# Patient Record
(demographics unavailable — no encounter records)

---

## 2017-11-13 NOTE — PHYS DOC
General


Chief Complaint:  VOMITING IN PREGNANCY


Stated Complaint:  N/V/D


Time Seen by MD:  20:14


Source:  patient


Exam Limitations:  no limitations


Problems:  





History of Present Illness


Initial Comments


Patient is a 34 weeks gestation  2 para 1 female complaining of nausea 

vomiting diarrhea.


She is here with her significant other he says he has similar symptoms. They 

deny travel or bad food exposure, patient says since 6 AM she's had nonbloody 

emesis with by mouth intolerance and loose watery stools which are also 

nonbloody. No fever they've had some body aches and chills no chest pain or 

trouble breathing. Patient continues to feel baby move she denies any vaginal 

symptoms. Over-the-counter medications have not helped the patient is afraid to 

eat or drink. On arrival she is afebrile heart rate is in the 110's, fetal 

heart tones 160.


Timing/Duration:  24 hours


Severity:  severe


Modifying Factors:  worse with eating


Associated Symptoms:  nausea/vomiting, weakness


Allergies:  


Coded Allergies:  


     guaifenesin (Verified  Allergy, Unknown, 17)





Past Medical History


Medical History:  no pertinent history


Surgical History:  noncontributory


Para:  1


:  2


LMP (Females 10-50):  pregnant





Social History


Smoker:  non-smoker


Alcohol:  none


Drugs:  none





Review of Systems


Constitutional:  see HPI


Respiratory:  denies cough, denies shortness of breath


Cardiovascular:  denies chest pain, denies palpitations


Gastrointestinal:  see HPI, abdominal pain, diarrhea, nausea, vomiting


Genitourinary:  denies dysuria, denies frequency, denies hematuria


Musculoskeletal:  denies back pain, denies joint swelling, denies neck pain


Psychiatric/Neurological:  denies headache, denies numbness, denies paresthesia





Physical Exam


General Appearance:  WD/WN, mild distress


Ear, Nose, Throat:  hearing grossly normal, normal ENT inspection


Neck:  non-tender, supple


Respiratory:  normal breath sounds, no respiratory distress


Cardiovascular:  normal peripheral pulses, regular rate, rhythm


Gastrointestinal:  soft (gravid consistent with dates, some mild epigastric 

tenderness negative Foley negative McBurney bowel sounds are normal)


Back:  no CVA tenderness, no vertebral tenderness


Extremities:  non-tender, normal inspection


Neurologic/Psychiatric:  CNs II-XII nml as tested, no motor/sensory deficits, 

alert, normal mood/affect, oriented x 3


Skin:  normal color, warm/dry





Orders, Labs, Meds


Labs unremarkable. Patient received 1 L normal saline IV bolus and Zofran IV. 

She was able to tolerate by mouth after the Zofran and ready for discharge home.


Departure


Time of Disposition:  22:06


Disposition:  01 HOME, SELF-CARE


Diagnosis:  gastroenteritis, pregnancy incidental


Condition:  GOOD


Patient Instructions:  Viral Gastroenteritis, Easy-to-Read





Additional Instructions:  


Off work through , note given.


Clear liquids today, advance to bland diet as tolerated tomorrow.


Aggressive hydration with Gatorade or water.


Continue prenatal vitamins.


Prescription: Zofran ODT


Call your OB/GYN tomorrow morning to notify them of this ED visit.


Follow-up with your doctor in 3-5 days for recheck.


Return to ED with new or changing symptoms.











SULEIMAN REDDY DO 2017 22:09

## 2019-07-25 NOTE — PHYS DOC
Past History


Past Medical History:  No Pertinent History


Past Surgical History:  No Surgical History


Smoking:  Non-smoker


Alcohol Use:  None


Drug Use:  None





Adult General


Chief Complaint


Chief Complaint:  Rash





HPI


HPI


22-year-old female presents at 32 weeks gestation with report of rash to trunk 

and inner thighs which is been intermittent for the past 2 days. Patient reports

she was seen at [facility] ED yesterday and started on prednisone taper 

and Benadryl. Patient reports she has been compliant with her medications. 

Reports despite compliance she continues to have this rash. Denies new 

detergents, soaps, shampoos, medications, or known exposure to allergen. Denies 

fever or chills. Reports fetal movement. Denies any vaginal discharge or 

bleeding.





Review of Systems


Review of Systems


Constitutional: Denies fever or chills 


Eyes: Denies redness or eye pain 


HENT: Denies nasal congestion or sore throat


Respiratory: Denies cough or shortness of breath 


Cardiovascular: Denies chest pain or palpitations


GI: Denies abdominal pain, nausea, or vomiting


/GYN: Denies dysuria or hematuria; reports pregnancy; denies vaginal bleeding 

or discharge


Musculoskeletal: Denies back pain or joint pain


Integument: Reports pruritis and rash


Neurologic: Denies headache, focal weakness or sensory changes





Complete systems were reviewed and found to be within normal limits, except as 

documented in this note.





Allergies


Allergies





Allergies








Coded Allergies Type Severity Reaction Last Updated Verified


 


  guaifenesin Allergy Unknown  11/13/17 Yes











Physical Exam


Physical Exam


Constitutional: Well developed, well nourished, no acute distress, non-toxic 

appearance


HENT: Normocephalic, atraumatic, oropharynx moist, tongue without swelling


Eyes: Conjunctiva normal, no discharge


Neck: Normal range of motion, no tenderness, supple


Cardiovascular: Heart rate normal, regular rhythm


Lungs & Thorax:  Bilateral breath sounds clear to auscultation, no wheezing


Abdomen: Soft, gravid uterus noted, nontender


Skin: Warm, dry, no erythema, urticarial rash noted to bilateral upper inner 

thighs and left lateral abdomen/flank


Back: No tenderness, no CVA tenderness


Extremities: No tenderness, ROM intact, no edema


Neurologic: Alert and oriented X 3, no focal deficits noted


Psychologic: Affect normal, judgement normal





EKG


EKG


[]





Radiology/Procedures


Radiology/Procedures


[]





Course & Med Decision Making


Course & Med Decision Making


Patient presents with history of present illness and physical exam concerning 

for urticarial rash in pregnancy. Patient previously started on steroids and 

antihistamines. Will add steroid cream and oral Pepcid. Patient stable for 

discharge with outpatient follow-up with PCP. Discussed findings and plan with 

patient, who acknowledges understanding and agreement.





Dragon Disclaimer


Dragon Disclaimer


This electronic medical record was generated, in whole or in part, using a voice

 recognition dictation system.





Departure


Departure:


Impression:  


   Primary Impression:  


   Pruritic urticarial papules and plaques of pregnancy


Disposition:  01 HOME, SELF-CARE


Condition:  STABLE


Referrals:  


RAFAELA CARVER MD (PCP)


Patient Instructions:  Pruritic Urticarial Papules and Plaques of Pregnancy


Scripts


Hydrocortisone (ANUSOL-HC) 30 Gm Cream..g.


1 ANNETTE TP BID PRN for RASH, #30 GM


   Prov: PERNELL LAWRENCE DO         7/25/19 


Famotidine (PEPCID) 20 Mg Tablet


1 TAB PO BID for Hives, #10 TAB


   Prov: PERNELL LAWRENCE DO         7/25/19











PERNELL LAWRENCE DO             Jul 25, 2019 00:16